# Patient Record
Sex: MALE | Race: BLACK OR AFRICAN AMERICAN | Employment: FULL TIME | URBAN - METROPOLITAN AREA
[De-identification: names, ages, dates, MRNs, and addresses within clinical notes are randomized per-mention and may not be internally consistent; named-entity substitution may affect disease eponyms.]

---

## 2018-12-11 ENCOUNTER — APPOINTMENT (OUTPATIENT)
Dept: GENERAL RADIOLOGY | Facility: CLINIC | Age: 33
End: 2018-12-11
Attending: EMERGENCY MEDICINE
Payer: COMMERCIAL

## 2018-12-11 ENCOUNTER — HOSPITAL ENCOUNTER (EMERGENCY)
Facility: CLINIC | Age: 33
Discharge: HOME | End: 2018-12-11
Attending: EMERGENCY MEDICINE | Admitting: EMERGENCY MEDICINE
Payer: COMMERCIAL

## 2018-12-11 VITALS
WEIGHT: 142 LBS | DIASTOLIC BLOOD PRESSURE: 80 MMHG | RESPIRATION RATE: 16 BRPM | OXYGEN SATURATION: 98 % | TEMPERATURE: 97.7 F | SYSTOLIC BLOOD PRESSURE: 138 MMHG

## 2018-12-11 DIAGNOSIS — J06.9 VIRAL URI WITH COUGH: ICD-10-CM

## 2018-12-11 LAB
FLUAV+FLUBV RNA SPEC QL NAA+PROBE: NEGATIVE
FLUAV+FLUBV RNA SPEC QL NAA+PROBE: NEGATIVE
RSV RNA SPEC NAA+PROBE: POSITIVE
SPECIMEN SOURCE: ABNORMAL

## 2018-12-11 PROCEDURE — 87631 RESP VIRUS 3-5 TARGETS: CPT | Performed by: EMERGENCY MEDICINE

## 2018-12-11 PROCEDURE — 99284 EMERGENCY DEPT VISIT MOD MDM: CPT | Mod: 25 | Performed by: EMERGENCY MEDICINE

## 2018-12-11 PROCEDURE — 99282 EMERGENCY DEPT VISIT SF MDM: CPT | Mod: Z6 | Performed by: EMERGENCY MEDICINE

## 2018-12-11 PROCEDURE — 71046 X-RAY EXAM CHEST 2 VIEWS: CPT

## 2018-12-11 RX ORDER — ACETAMINOPHEN 500 MG
1000 TABLET ORAL EVERY 6 HOURS PRN
COMMUNITY

## 2018-12-11 ASSESSMENT — ENCOUNTER SYMPTOMS
COUGH: 0
SORE THROAT: 0
APPETITE CHANGE: 0
RHINORRHEA: 0
HEADACHES: 0
ABDOMINAL PAIN: 0
NAUSEA: 0
FEVER: 0
VOMITING: 0

## 2018-12-11 NOTE — ED AVS SNAPSHOT
[facility], Emergency Department  [address]  [place] [address]  Phone:  [phone]  Fax:  [phone]                                    Joy Smith   MRN: [id number]    Department:  [facility], Emergency Department   Date of Visit:  12/11/2018           After Visit Summary Signature Page    I have received my discharge instructions, and my questions have been answered. I have discussed any challenges I see with this plan with the nurse or doctor.    ..........................................................................................................................................  Patient/Patient Representative Signature      ..........................................................................................................................................  Patient Representative Print Name and Relationship to Patient    ..................................................               ................................................  Date                                   Time    ..........................................................................................................................................  Reviewed by Signature/Title    ...................................................              ..............................................  Date                                               Time          22EPIC Rev 08/18

## 2018-12-11 NOTE — ED PROVIDER NOTES
"  History     Chief Complaint   Patient presents with     Fever     Cough     Nausea & Vomiting     The history is provided by the patient.     Joy Smith is a 33 year old otherwise healthy male who presents to the Emergency Department for evaluation of fever, headache, cough, rhinorrhea, nausea, and vomiting phlegm from this morning but symptoms has since subsided. Patient reports that he did not take his temperature at home but reports that he \"felt hot\" but this improved after putting a cool towel on his head this morning. He reports that he took 2 Tylenols this morning and his headache went away. He reports that he had a sore throat when he awoke this morning but that has since resolved. He denies abdominal pain. He denies a change in appetite. He reports that his 1 year old son has been sick for the past 3-4 days with fever, cough, and runny nose. He reports taking the flu shot recently. He denies a history of asthma or any other significant past medical history.    History reviewed. No pertinent past medical history.    History reviewed. No pertinent surgical history.    No family history on file.    Social History     Tobacco Use     Smoking status: Current Every Day Smoker     Packs/day: 0.25     Smokeless tobacco: Never Used   Substance Use Topics     Alcohol use: Yes     Comment: socially       No current facility-administered medications for this encounter.      Current Outpatient Medications   Medication     acetaminophen (TYLENOL) 500 MG tablet      No Known Allergies    I have reviewed the Medications, Allergies, Past Medical and Surgical History, and Social History in the Epic system.    Review of Systems   Constitutional: Negative for appetite change and fever.   HENT: Negative for rhinorrhea and sore throat.    Respiratory: Negative for cough.    Gastrointestinal: Negative for abdominal pain, nausea and vomiting.   Neurological: Negative for headaches.   All other systems reviewed and are " negative.      Physical Exam   BP: 138/80  Heart Rate: 91  Temp: 97.7  F (36.5  C)  Resp: 16  Weight: 64.4 kg (142 lb)  SpO2: 98 %      Physical Exam  General: awake, alert, NAD  Head: normal cephalic  HEENT: pupils equal, conjugate gaze in tact.  Moist mucous membranes.  Normal-appearing posterior oropharynx.  No tonsillar swelling or exudates.  Neck: Supple  CV: regular rate and rhythm without murmur  Lungs: possible rales in lower left lung field, otherwise clear lungs.  Normal rate and normal effort.  Abd: soft, non-tender, no guarding, no peritoneal signs  EXT: lower extremities without swelling or edema  Neuro: awake, answers questions appropriately. No focal deficits noted       ED Course   5:31 PM  The patient was seen and examined by Noel Bee MD in Room ED03.        Procedures             Labs Ordered and Resulted from Time of ED Arrival Up to the Time of Departure from the ED - No data to display         Assessments & Plan (with Medical Decision Making)   Joy is a 33-year-old male who presents with productive cough, fever, nausea vomiting.  He has no abdominal pain, he has a completely benign and nontender abdominal exam, and states he no longer feels nauseated.  Do not think this represents an acute intra-abdominal pathology and we will not pursue that at this time.  He is otherwise well-appearing and currently has stable vital signs including normal O2 saturations on room air.  Given fever and productive cough and questionable rales on exam I did obtain a chest x-ray to rule out pneumonia we will also obtain influenza swabs as he would be within the window to treat for Tamiflu.    Chest x-ray without evidence of infection.  Influenza negative however RSV is positive.  Was made aware of this.  He does have a son who is sick at home but reports his son is actually getting better, he knows to monitor his son's breathing.    Recommended over-the-counter cough suppressants and Tylenol and ibuprofen as  needed for symptomatic management.  Patient states he no longer has any nausea or vomiting and is tolerating p.o.     I have reviewed the nursing notes.    I have reviewed the findings, diagnosis, plan and need for follow up with the patient.    Current Discharge Medication List          Final diagnoses:   Viral URI with cough     I, Violet Temple, am serving as a trained medical scribe to document services personally performed by Noel Vu MD, based on the provider's statements to me.      INoel MD, was physically present and have reviewed and verified the accuracy of this note documented by Violet Temple.     12/11/2018   [facility], [place], EMERGENCY DEPARTMENT     Noel Vu MD  12/11/18 0974

## 2018-12-12 NOTE — DISCHARGE INSTRUCTIONS
TODAY'S VISIT:  You were seen today for cough, nausea, vomiting.  Chest x-ray showed no pneumonia  -     FOLLOW-UP:  Please make an appointment to follow up with:  - Your Primary Care Provider in 2-3 days unless symptoms completely resolve.    PRESCRIPTIONS / MEDICATIONS:  -Take OTC cough suppressant as needed for symptoms  -Take Tylenol and IBU as needed for sore throat/body aches    OTHER INSTRUCTIONS:  -     RETURN TO THE EMERGENCY DEPARTMENT  Return to the Emergency Department at any time for new/worsening symptoms. Especially chest pain or shortness of breath.
